# Patient Record
Sex: MALE | Race: WHITE
[De-identification: names, ages, dates, MRNs, and addresses within clinical notes are randomized per-mention and may not be internally consistent; named-entity substitution may affect disease eponyms.]

---

## 2019-06-10 ENCOUNTER — HOSPITAL ENCOUNTER (EMERGENCY)
Dept: HOSPITAL 76 - ED | Age: 29
Discharge: HOME | End: 2019-06-10
Payer: COMMERCIAL

## 2019-06-10 VITALS — SYSTOLIC BLOOD PRESSURE: 129 MMHG | DIASTOLIC BLOOD PRESSURE: 73 MMHG
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
## 2019-06-10 DIAGNOSIS — S29.011A: Primary | ICD-10-CM

## 2019-06-10 DIAGNOSIS — F17.210: ICD-10-CM

## 2019-06-10 PROCEDURE — 99283 EMERGENCY DEPT VISIT LOW MDM: CPT

## 2019-06-10 PROCEDURE — 71046 X-RAY EXAM CHEST 2 VIEWS: CPT

## 2019-06-10 NOTE — ED PHYSICIAN DOCUMENTATION
History of Present Illness





- Stated complaint


Stated Complaint: LT SIDE PX





- Chief complaint


Chief Complaint: General





- History obtained from


History obtained from: Patient





- History of Present Illness


Timing: Other (He had a cough with some yellow sputum for about 3 weeks.  After 

exercising about a week ago he smoked a cigarette and had a coughing fit and 

since then has had a persistent sharp pain to the left lower anterior chest wall

when he takes a deep breath or coughs.)





Review of Systems


Constitutional: denies: Fever, Chills


Cardiac: denies: Palpitations, Pedal edema, Calf pain


Respiratory: denies: Dyspnea, Hemoptysis, Wheezing


GI: denies: Abdominal Pain





PD PAST MEDICAL HISTORY





- Present Medications


Home Medications: 


                                Ambulatory Orders











 Medication  Instructions  Recorded  Confirmed


 


Albuterol Sulf [Ventolin Hfa 1 - 2 puffs INH Q4HR PRN #1 inhaler 06/10/19 





Inhaler]   


 


Hydrocodone/Acetaminophen 1 - 2 each PO Q6H PRN #14 tablet 06/10/19 





[Hydrocodon-Acetaminophen 5-325]   














- Allergies


Allergies/Adverse Reactions: 


                                    Allergies











Allergy/AdvReac Type Severity Reaction Status Date / Time


 


Penicillins Allergy  Hives Verified 06/10/19 13:24














PD ED PE NORMAL





- Vitals


Vital signs reviewed: Yes





- General


General: Alert and oriented X 3, No acute distress





- Cardiac


Cardiac: RRR, No murmur, Other (Mild tenderness of about rib 8, midclavicular 

line on the left)





- Respiratory


Respiratory: No respiratory distress, Clear bilaterally





- Abdomen


Abdomen: Non tender





- Extremities


Extremities: No edema, No calf tenderness / cord





- Neuro


Neuro: Alert and oriented X 3, Normal speech





Results





- Vitals


Vitals: 





                               Vital Signs - 24 hr











  06/10/19





  13:21


 


Temperature 36.6 C


 


Heart Rate 82


 


Respiratory 14





Rate 


 


Blood Pressure 129/73


 


O2 Saturation 98








                                     Oxygen











O2 Source                      Room air

















- Rads (name of study)


  ** 2v chest


Radiology: EMP read contemporaneously (NAD)





PD MEDICAL DECISION MAKING





- ED course


ED course: 





I considered pulmonary embolism in this patient.  Clinically the pretest 

probability of pulmonary embolism is less than 15%.  I applied to the PERC rules

as follows: The patient's age is under 50, heart rate less than 100, oxygen satu

ration greater than 94%, the patient does not have a history of DVT or PE.  

Patient has no recent trauma or surgery.  The patient has no hemoptysis.  The 

patient is not on exogenous estrogens.  The patient does not have clinical signs

suggesting DVT.  As such the patient ruled out for pulmonary embolism by PERC 

criteria.





Departure





- Departure


Disposition: 01 Home, Self Care


Clinical Impression: 


Intercostal muscle strain


Qualifiers:


 Encounter type: initial encounter Qualified Code(s): S29.011A - Strain of 

muscle and tendon of front wall of thorax, initial encounter





Condition: Good


Record reviewed to determine appropriate education?: Yes


Instructions:  ED Strain Chest Wall Ch


Prescriptions: 


Albuterol Sulf [Ventolin Hfa Inhaler] 1 - 2 puffs INH Q4HR PRN #1 inhaler


 PRN Reason: Shortness Of Air/Wheezing


Hydrocodone/Acetaminophen [Hydrocodon-Acetaminophen 5-325] 1 - 2 each PO Q6H PRN

#14 tablet


 PRN Reason: pain


Comments: 


Call your doctor to arrange a follow-up appointment, make the next available 

appointment.  In the interim, return anytime if worse or if new symptoms 

develop.

## 2019-06-10 NOTE — XRAY REPORT
Reason:  point tenderness to left anterior rib, after cough

Procedure Date:  06/10/2019   

Accession Number:  [id number] / [id number]                    

Procedure:  XR  - Chest 2 View X-Ray CPT Code:  11468

 

FULL RESULT:

 

 

EXAM:

CHEST RADIOGRAPHY

 

EXAM DATE: 6/10/2019 01:50 PM.

 

CLINICAL HISTORY: Point tenderness to left anterior rib, after cough.

 

COMPARISON: None.

 

TECHNIQUE: 2 views.

 

FINDINGS:

Lungs/Pleura: No focal opacities evident. No pleural effusion. No 

pneumothorax. Normal volumes.

 

Mediastinum: Heart and mediastinal contours are normal.

 

Other: None.

IMPRESSION: No acute cardiopulmonary abnormality.

 

[facility]